# Patient Record
Sex: FEMALE | Race: BLACK OR AFRICAN AMERICAN | NOT HISPANIC OR LATINO | Employment: PART TIME | URBAN - METROPOLITAN AREA
[De-identification: names, ages, dates, MRNs, and addresses within clinical notes are randomized per-mention and may not be internally consistent; named-entity substitution may affect disease eponyms.]

---

## 2019-02-23 ENCOUNTER — HOSPITAL ENCOUNTER (EMERGENCY)
Facility: CLINIC | Age: 24
Discharge: HOME | End: 2019-02-24
Attending: INTERNAL MEDICINE | Admitting: INTERNAL MEDICINE
Payer: COMMERCIAL

## 2019-02-23 ENCOUNTER — APPOINTMENT (OUTPATIENT)
Dept: GENERAL RADIOLOGY | Facility: CLINIC | Age: 24
End: 2019-02-23
Attending: INTERNAL MEDICINE
Payer: COMMERCIAL

## 2019-02-23 VITALS
SYSTOLIC BLOOD PRESSURE: 126 MMHG | OXYGEN SATURATION: 100 % | HEART RATE: 73 BPM | DIASTOLIC BLOOD PRESSURE: 74 MMHG | RESPIRATION RATE: 16 BRPM | TEMPERATURE: 98.7 F

## 2019-02-23 DIAGNOSIS — S86.911A KNEE STRAIN, RIGHT, INITIAL ENCOUNTER: ICD-10-CM

## 2019-02-23 PROCEDURE — 99284 EMERGENCY DEPT VISIT MOD MDM: CPT | Mod: 25 | Performed by: INTERNAL MEDICINE

## 2019-02-23 PROCEDURE — 29505 APPLICATION LONG LEG SPLINT: CPT | Performed by: INTERNAL MEDICINE

## 2019-02-23 PROCEDURE — 73562 X-RAY EXAM OF KNEE 3: CPT | Mod: RT

## 2019-02-23 PROCEDURE — 99283 EMERGENCY DEPT VISIT LOW MDM: CPT | Mod: Z6 | Performed by: INTERNAL MEDICINE

## 2019-02-23 SDOH — HEALTH STABILITY: MENTAL HEALTH: HOW OFTEN DO YOU HAVE A DRINK CONTAINING ALCOHOL?: NEVER

## 2019-02-23 ASSESSMENT — ENCOUNTER SYMPTOMS
ARTHRALGIAS: 1
NUMBNESS: 0
WEAKNESS: 0
ABDOMINAL PAIN: 0
SHORTNESS OF BREATH: 0
FEVER: 0
CONFUSION: 0

## 2019-02-23 NOTE — ED AVS SNAPSHOT
[facility], Emergency Department  [address]  [facility] [place] [address]  Phone:  [phone]  Fax:  [phone]                                    Desi Naik   MRN: [id number]    Department:  [facility], Emergency Department   Date of Visit:  2/23/2019           After Visit Summary Signature Page    I have received my discharge instructions, and my questions have been answered. I have discussed any challenges I see with this plan with the nurse or doctor.    ..........................................................................................................................................  Patient/Patient Representative Signature      ..........................................................................................................................................  Patient Representative Print Name and Relationship to Patient    ..................................................               ................................................  Date                                   Time    ..........................................................................................................................................  Reviewed by Signature/Title    ...................................................              ..............................................  Date                                               Time          22EPIC Rev 08/18

## 2019-02-24 RX ORDER — IBUPROFEN 200 MG
400 TABLET ORAL EVERY 8 HOURS PRN
Qty: 60 TABLET | Refills: 0 | Status: SHIPPED | OUTPATIENT
Start: 2019-02-23 | End: 2019-03-25

## 2019-02-24 NOTE — ED PROVIDER NOTES
History     Chief Complaint   Patient presents with     Knee Pain     Pt reports right knee pain post sleeding on Thursday. Hurts to bend, localized pain. Pt took 400/mg advil without effect.      HPI  Desi Naik is a 23 year old female who presents with right knee pain and stiffness. She was sledding 2 days ago. She took a tumble. She is unsure of the mechanism of the injury. Since then her knee has been stiff and sore. Pain seems worse with flexion of the knee and with driving. Pain is diffuse in the knee, but worst medially and posteriorly. She has no pain in the patella. She has no pain in the hip, thigh, calf, ankle or foot. She has no numbness or weakness. She has no swelling in the calf.    History reviewed. No pertinent past medical history.    History reviewed. No pertinent surgical history.    History reviewed. No pertinent family history.    Social History     Tobacco Use     Smoking status: Never Smoker     Smokeless tobacco: Never Used   Substance Use Topics     Alcohol use: No     Frequency: Never         I have reviewed the Medications, Allergies, Past Medical and Surgical History, and Social History in the Epic system.    Review of Systems   Constitutional: Negative for fever.   Respiratory: Negative for shortness of breath.    Cardiovascular: Negative for chest pain.   Gastrointestinal: Negative for abdominal pain.   Musculoskeletal: Positive for arthralgias.   Neurological: Negative for weakness and numbness.   Psychiatric/Behavioral: Negative for confusion.   All other systems reviewed and are negative.      Physical Exam   BP: 126/74  Pulse: 73  Temp: 98.7  F (37.1  C)  Resp: 16  SpO2: 100 %      Physical Exam   Constitutional: She is oriented to person, place, and time. She appears well-developed and well-nourished. No distress.   HENT:   Head: Normocephalic and atraumatic.   Eyes: EOM are normal. Pupils are equal, round, and reactive to light.   Neck: Normal range of  motion.   Cardiovascular: Normal rate.   Pulmonary/Chest: Effort normal.   Musculoskeletal: She exhibits tenderness.        Right hip: Normal.        Right knee: She exhibits normal range of motion, no swelling, no effusion, no deformity, no LCL laxity, normal patellar mobility, normal meniscus and no MCL laxity. Tenderness found. Medial joint line and lateral joint line tenderness noted. No patellar tendon tenderness noted.        Right ankle: Normal.        Right upper leg: Normal.        Right lower leg: Normal.        Right foot: Normal.   Neurological: She is alert and oriented to person, place, and time. No cranial nerve deficit.   Skin: Skin is warm and dry.   Psychiatric: She has a normal mood and affect. Her behavior is normal.   Nursing note and vitals reviewed.      ED Course        Procedures          Labs/Imaging    Results for orders placed or performed during the hospital encounter of 02/23/19 (from the past 24 hour(s))   XR Knee Right 3 Views    Narrative    XR KNEE RT 3 VW 2/23/2019 11:55 PM    HISTORY: Pain after trauma.    COMPARISON: None.    FINDINGS: No fracture or malalignment. Osseous structures appear  normal. No joint effusion.      Impression    IMPRESSION: No acute osseous abnormality.    YARELIS RUBIO MD         Assessments & Plan (with Medical Decision Making)   Impression:  Young female presents with sledding related right knee injury. She has no effusion on exam. ACL and PCL appear stable. She has medial and posterior pain. MCL and LCL are stable. Will treat with knee immobilizer and NSAID. Follow up sports medicine clinic.    I have reviewed the nursing notes.    I have reviewed the findings, diagnosis, plan and need for follow up with the patient.       Medication List      Started    ibuprofen 200 MG tablet  Commonly known as:  ADVIL/MOTRIN  400 mg, Oral, EVERY 8 HOURS PRN            Final diagnoses:   Knee strain, right, initial encounter       2/23/2019   [facility], [place],  EMERGENCY DEPARTMENT     Ethan Rdz MD  02/24/19 0005

## 2019-02-24 NOTE — DISCHARGE INSTRUCTIONS
Knee immobilizer when up on feet.  Crutches weightbearing as tolerated.  Ibuprofen 400 mg 4 times/ day as needed for pain.  Follow up [facility]. You will be called next week to set up an appointment.

## 2019-02-24 NOTE — ED TRIAGE NOTES
Pt reports right knee pain post sleeding on Thursday. Hurts to bend, localized pain. Pt took 400/mg advil without effect.

## 2019-02-25 ENCOUNTER — TELEPHONE (OUTPATIENT)
Dept: ORTHOPEDICS | Facility: CLINIC | Age: 24
End: 2019-02-25

## 2019-02-25 NOTE — TELEPHONE ENCOUNTER
Called patient and offered a follow up to her ED visit with one of our sports medicine providers. If she calls back please schedule her with any one in sports med for anytime this week or early next week

## 2021-10-16 ENCOUNTER — HOSPITAL ENCOUNTER (EMERGENCY)
Facility: CLINIC | Age: 26
Discharge: HOME | End: 2021-10-16
Attending: EMERGENCY MEDICINE | Admitting: EMERGENCY MEDICINE
Payer: COMMERCIAL

## 2021-10-16 VITALS
OXYGEN SATURATION: 100 % | SYSTOLIC BLOOD PRESSURE: 121 MMHG | RESPIRATION RATE: 16 BRPM | HEART RATE: 61 BPM | TEMPERATURE: 97.3 F | DIASTOLIC BLOOD PRESSURE: 67 MMHG

## 2021-10-16 DIAGNOSIS — R21 RASH: ICD-10-CM

## 2021-10-16 DIAGNOSIS — L03.119 CELLULITIS OF AXILLA, UNSPECIFIED LATERALITY: ICD-10-CM

## 2021-10-16 PROCEDURE — 99283 EMERGENCY DEPT VISIT LOW MDM: CPT

## 2021-10-16 RX ORDER — CEPHALEXIN 500 MG/1
500 CAPSULE ORAL 3 TIMES DAILY
Qty: 21 CAPSULE | Refills: 0 | Status: SHIPPED | OUTPATIENT
Start: 2021-10-16 | End: 2021-10-23

## 2021-10-16 RX ORDER — GINSENG 100 MG
CAPSULE ORAL 2 TIMES DAILY
Qty: 14 G | Refills: 0 | Status: SHIPPED | OUTPATIENT
Start: 2021-10-16 | End: 2021-10-23

## 2021-10-16 ASSESSMENT — ENCOUNTER SYMPTOMS: FEVER: 0

## 2021-10-16 NOTE — ED PROVIDER NOTES
History   Chief Complaint:  Rash    HPI   Desi Naik is a 26 year old female who presents for evaluation of a rash. About two weeks ago the patient started using a new Old Spice deodorant and about a week ago she started to develop a painful and burning rash in both of her underarms. Since then she has not been using the deodorant and has been applying Aquaphor ointment to the area, but the rash has not improved significantly. Due to concern for this new rash, she came into the ED for evaluation. She has no known history of hidradenitis suppurativa. She has not had any fever.     Review of Systems   Constitutional: Negative for fever.   Skin: Positive for rash.   All other systems reviewed and are negative.    Allergies:  The patient has no known allergies.     Medications:  The patient is not currently taking any prescribed medications.     Past Medical History:     The patient denies any past medical history.     Social History:  The patient presents to the ED alone.     Physical Exam     Patient Vitals for the past 24 hrs:   BP Temp Temp src Pulse Resp SpO2   10/16/21 1330 121/67 97.3  F (36.3  C) Oral 61 16 100 %       Physical Exam  Physical Exam   General:  Sitting on bed, comfortable appearing.   HENT:  No obvious trauma to head  Right Ear:  External ear normal.   Left Ear:  External ear normal.   Nose:  Nose normal.   Eyes:  Conjunctivae and EOM are normal.  Neck: Normal range of motion. Neck supple. No tracheal deviation present.   Pulm/Chest: No respiratory distress  M/S: Normal range of motion.   Neuro: Alert. GCS 15.  Skin: Skin is warm and dry. Under bilateral axilla there is scant erythema and warmth, no palpable abscess, no signs of hidradenitis suppurativa.  Very scant amount of purulence underneath the left axilla.  Specifically no palpable or visible abscess to the left axilla.  Not diaphoretic.   Psych: Normal mood and affect. Behavior is normal.         Emergency  Department Course     Emergency Department Course:  Reviewed:  I reviewed nursing notes, vitals and past medical history    Assessments:  1405: I obtained history and examined the patient as noted above.     Disposition:  The patient was discharged to home.     Impression & Plan   Medical Decision Making:  eDsi Naik is a very pleasant 26 year old female who presents for evaluation of skin redness.  This started after using an old spice deodorant that was new for her to use.  The skin was irritated so she discontinued using it and placed Aquaphor over the skin.  I considered allergic reaction, but the skin is erythematous and warm.  She has no history of hidradenitis suppurativa and there is no palpable abscess.  She has no other lesions or rash or reaction on her skin to suggest other conditions such as Dockery-Saad syndrome.  The history, physical exam and supporting data are consistent with cellulitis.  There do not appear at this time to be any complication of cellulitis including necrotizing fascitis, lymphangitis, lymphadenitis, abscess, osteomyelitis, sepsis, or shock.  The patient is not immunosuppressed.  Supportive outpatient management is indicated with antibiotics.  Close follow-up of primary care physician to ensure no progression and rapid resolution.  Area of cellulitis was outlined.  Cellulitis precautions for home.    The treatment plan was discussed with the patient and they expressed understanding of this plan and consented to the plan.  In addition, the patient will return to the emergency department if their symptoms persist, worsen, if new symptoms arise or if there is any concern as other pathology may be present that is not evident at this time. They also understand the importance of close follow up in the clinic and if unable to do so will return to the emergency department for a reevaluation. All questions were answered.     Diagnosis:    ICD-10-CM    1. Rash  R21     2. Cellulitis of axilla, unspecified laterality  L03.119        Discharge Medications:  Discharge Medication List as of 10/16/2021  2:17 PM      START taking these medications    Details   bacitracin (TGT BACITRACIN) 500 UNIT/GM OINT Apply topically 2 times daily for 7 daysDisp-14 g, [id number]      cephALEXin (KEFLEX) 500 MG capsule Take 1 capsule (500 mg) by mouth 3 times daily for 7 days, Disp-21 capsule, R-0, E-Prescribe             Scribe Disclosure:  I, Tian Aldridge, am serving as a scribe at 2:05 PM on 10/16/2021 to document services personally performed by Cameron Anton DO, based on my observations and the provider's statements to me.        Wilbur Anton DO  10/16/21 1524

## 2022-12-23 ENCOUNTER — HOSPITAL ENCOUNTER (EMERGENCY)
Facility: CLINIC | Age: 27
Discharge: HOME | End: 2022-12-23
Admitting: NURSE PRACTITIONER
Payer: COMMERCIAL

## 2022-12-23 VITALS
OXYGEN SATURATION: 100 % | DIASTOLIC BLOOD PRESSURE: 57 MMHG | HEART RATE: 77 BPM | TEMPERATURE: 97.4 F | SYSTOLIC BLOOD PRESSURE: 109 MMHG | RESPIRATION RATE: 16 BRPM

## 2022-12-23 DIAGNOSIS — S61.209A AVULSION OF FINGERTIP, INITIAL ENCOUNTER: ICD-10-CM

## 2022-12-23 PROCEDURE — 99283 EMERGENCY DEPT VISIT LOW MDM: CPT | Mod: 25 | Performed by: NURSE PRACTITIONER

## 2022-12-23 PROCEDURE — 99282 EMERGENCY DEPT VISIT SF MDM: CPT | Performed by: NURSE PRACTITIONER

## 2022-12-23 PROCEDURE — 250N000011 HC RX IP 250 OP 636: Performed by: NURSE PRACTITIONER

## 2022-12-23 PROCEDURE — 250N000009 HC RX 250: Performed by: NURSE PRACTITIONER

## 2022-12-23 PROCEDURE — 90471 IMMUNIZATION ADMIN: CPT | Performed by: NURSE PRACTITIONER

## 2022-12-23 PROCEDURE — 90715 TDAP VACCINE 7 YRS/> IM: CPT | Performed by: NURSE PRACTITIONER

## 2022-12-23 RX ADMIN — CLOSTRIDIUM TETANI TOXOID ANTIGEN (FORMALDEHYDE INACTIVATED), CORYNEBACTERIUM DIPHTHERIAE TOXOID ANTIGEN (FORMALDEHYDE INACTIVATED), BORDETELLA PERTUSSIS TOXOID ANTIGEN (GLUTARALDEHYDE INACTIVATED), BORDETELLA PERTUSSIS FILAMENTOUS HEMAGGLUTININ ANTIGEN (FORMALDEHYDE INACTIVATED), BORDETELLA PERTUSSIS PERTACTIN ANTIGEN, AND BORDETELLA PERTUSSIS FIMBRIAE 2/3 ANTIGEN 0.5 ML: 5; 2; 2.5; 5; 3; 5 INJECTION, SUSPENSION INTRAMUSCULAR at 19:28

## 2022-12-23 RX ADMIN — Medication 3 ML: at 19:15

## 2022-12-23 ASSESSMENT — ENCOUNTER SYMPTOMS
SHORTNESS OF BREATH: 0
FREQUENCY: 0
COUGH: 0
WOUND: 1
VOMITING: 0
NAUSEA: 0
CHILLS: 0
DIZZINESS: 0
HEADACHES: 0
FEVER: 0
SORE THROAT: 0

## 2022-12-23 ASSESSMENT — ACTIVITIES OF DAILY LIVING (ADL): ADLS_ACUITY_SCORE: 33

## 2022-12-24 NOTE — ED PROVIDER NOTES
ED Provider Note  [facility]      History     Chief Complaint   Patient presents with     Laceration     HPI  Desi Naik is a 27 year old female who presents to the emergency department with a left pinky laceration she suffered shortly before arrival to the department. She reports she was cutting an apple this evening when she cut her small finger on her left hand.    She is unsure of her tetanus status, and [facility] has no listing. She denies any other complaints, denies being on any blood thinners.    Past Medical History  No past medical history on file.  No past surgical history on file.  mineral oil-white petrolatum (MINERIN, EUCERIN) CREA  triamcinolone (KENALOG) 0.1 % cream      No Known Allergies  Family History  Family History   Problem Relation Age of Onset     Family History Negative Mother      Family History Negative Father      Social History   Social History     Tobacco Use     Smoking status: Never     Smokeless tobacco: Never   Substance Use Topics     Alcohol use: No     Drug use: No      Past medical history, past surgical history, medications, allergies, family history, and social history were reviewed with the patient. No additional pertinent items.       Review of Systems   Constitutional: Negative for chills and fever.   HENT: Negative for sore throat.    Respiratory: Negative for cough and shortness of breath.    Cardiovascular: Negative for chest pain.   Gastrointestinal: Negative for nausea and vomiting.   Genitourinary: Negative for frequency.   Skin: Positive for wound.   Neurological: Negative for dizziness and headaches.     A complete review of systems was performed with pertinent positives and negatives noted in the HPI, and all other systems negative.    Physical Exam   BP: 109/57  Pulse: 77  Temp: 97.4  F (36.3  C)  Resp: 16  SpO2: 100 %  Physical Exam  Vitals reviewed.   Constitutional:       Appearance: Normal appearance.   HENT:       Head: Normocephalic.      Right Ear: External ear normal.      Left Ear: External ear normal.      Mouth/Throat:      Mouth: Mucous membranes are moist.   Eyes:      Conjunctiva/sclera: Conjunctivae normal.   Cardiovascular:      Rate and Rhythm: Normal rate.      Pulses: Normal pulses.   Pulmonary:      Effort: Pulmonary effort is normal.   Abdominal:      General: Abdomen is flat.   Musculoskeletal:         General: Normal range of motion.      Cervical back: Normal range of motion.   Skin:     Findings: Wound present.      Comments: Small, approximate 1 cm left pinky fingertip avulsion.   Neurological:      Mental Status: She is alert.         ED Course      Procedures        [facility]    -Fingertip Avulsion Repair    Date/Time: 12/23/2022 8:04 PM  Performed by: eTmo Wu APRN CNP  Authorized by: Temo Wu APRN CNP     Risks, benefits and alternatives discussed.      ANESTHESIA (see MAR for exact dosages):     Anesthesia method:  Topical application    Topical anesthetic:  LET  Avulsion DETAILS     Location:  Finger    Finger location:  L small finger    Length (cm):  1  EXPLORATION:     Hemostasis achieved with:  LET and tourniquet    Wound extent: no signs of injury, no nerve damage, no tendon damage, no underlying fracture and no vascular damage      Contaminated: no      TREATMENT:     Area cleansed with:  Betadine and soap and water    Amount of cleaning:  Extensive    Irrigation solution:  Tap water    Irrigation volume:  Large vollume    SKIN REPAIR     Repair method:  Surgifoam and non-adherent gauze    POST-PROCEDURE DETAILS     Dressing:  Non-adherent dressing and tube gauze      PROCEDURE  Describe Procedure: Patient with fingertip avulsion to her left pinky, without any underlying tissue that would lend itself for suture repair. Finger was mildly oozing upon arrival. The tip of her finger was soaked in LET and following this a tourniquet was  applied to the base of her left pinky. Hemostasis was achieved with this. Betadine was used to cleanse the area followed by irrigation with tap water fpr 5 minutes. Tourniquet was removed, and surgifoam was applied to exposed tissue. This was covered with gauze, and wrapped in Kerlix. This was then covered with tube gauze.     No results found for any visits on 12/23/22.  Medications   lido-EPINEPHrine-tetracaine (LET) topical gel GEL (3 mLs Topical Given 12/23/22 1915)   Tdap (tetanus-diphtheria-acell pertussis) (ADACEL) injection 0.5 mL (0.5 mLs Intramuscular Given 12/23/22 1928)        Assessments & Plan (with Medical Decision Making)    27 year old female who presents with fingertip avulsion. The distal tip of her left pinky was cleanly avulsed without any tissue to be sutured. There was also no damage to the underlying bone or tendon damage. Bleeding was stopped with tourniquet application and LET application. Surgifoam was applied following copious irrigation and cleansing and covered with gauze.     As her tetanus status was unknown, she was given a tdap booster.    I reviewed the plan to keep the surgifoam in place underneath the gauze at least for the next 12 hours. Gave her additional surgifoam and guaze she can use to reapply these as needed. Discussed monitoring for signs of infection, such as redness, increased drainage, fevers or chills.    I have reviewed the nursing notes. I have reviewed the findings, diagnosis, plan and need for follow up with the patient.    Discharge Medication List as of 12/23/2022  7:54 PM          Final diagnoses:   Avulsion of fingertip, initial encounter       --  GRANT Mehta CNP  [facility] EMERGENCY DEPARTMENT  12/23/2022     Temo Wu APRN CNP  12/23/22 2025

## 2022-12-24 NOTE — ED TRIAGE NOTES
Laceration to the left pinky finger while slicing apples at home. Bleeding controlled in triage.      Triage Assessment     Row Name 12/23/22 4556       Triage Assessment (Adult)    Airway WDL WDL       Respiratory WDL    Respiratory WDL WDL       Skin Circulation/Temperature WDL    Skin Circulation/Temperature WDL WDL       Cardiac WDL    Cardiac WDL WDL       Peripheral/Neurovascular WDL    Peripheral Neurovascular WDL WDL       Cognitive/Neuro/Behavioral WDL    Cognitive/Neuro/Behavioral WDL WDL

## 2022-12-24 NOTE — DISCHARGE INSTRUCTIONS
TODAY'S VISIT:  - You were seen today for laceration of the tip of your finger called an avulsion.    - Emergency Department testing is focused on the potential causes of your symptoms that are the most dangerous possibilities and cannot cover every possibility. Based on the evaluation, it was deemed sufficiently safe to discharge and continue management through the clinics. Thus, follow-up is very important to assess for improvement/worsening, potential further testing, and potential treatment adjustments.     FOLLOW-UP:  Please make an appointment to follow up with:  - Your Primary Care Provider as needed.      OTHER INSTRUCTIONS:  - Keep the tube gauze on at least 12 hours. After this you can remove the larger bulkier tube dressing. If you remove all the gauze, try to keep the foam surgifoam in place to the tip of your pinky to help prevent future bleeding. If you need to change the dressing we are sending you home with extra gauze and surgifoam you can place on your finger like I showed you.    RETURN TO THE EMERGENCY DEPARTMENT  Return to the Emergency Department at any time for any new or worsening symptoms or any concerns.

## 2022-12-24 NOTE — PROGRESS NOTES
Discharge: Discharged from ED to home. Discharge instructions given and patient voiced understanding.   Skin assessment: Left pinky finger site dry with dressing. Tdap given. Discharge.

## 2023-04-02 ENCOUNTER — HEALTH MAINTENANCE LETTER (OUTPATIENT)
Age: 28
End: 2023-04-02

## 2024-06-08 ENCOUNTER — HEALTH MAINTENANCE LETTER (OUTPATIENT)
Age: 29
End: 2024-06-08

## 2025-01-15 ENCOUNTER — LAB REQUISITION (OUTPATIENT)
Dept: LAB | Facility: CLINIC | Age: 30
End: 2025-01-15
Payer: COMMERCIAL

## 2025-01-15 DIAGNOSIS — Z12.4 ENCOUNTER FOR SCREENING FOR MALIGNANT NEOPLASM OF CERVIX: ICD-10-CM

## 2025-01-15 PROCEDURE — G0145 SCR C/V CYTO,THINLAYER,RESCR: HCPCS | Mod: ORL | Performed by: OBSTETRICS & GYNECOLOGY

## 2025-01-20 LAB
BKR LAB AP GYN ADEQUACY: NORMAL
BKR LAB AP GYN INTERPRETATION: NORMAL
BKR LAB AP HPV REFLEX: NORMAL
BKR LAB AP LMP: NORMAL
BKR LAB AP PREVIOUS ABNL DX: NORMAL
BKR LAB AP PREVIOUS ABNORMAL: NORMAL
PATH REPORT.COMMENTS IMP SPEC: NORMAL
PATH REPORT.COMMENTS IMP SPEC: NORMAL
PATH REPORT.RELEVANT HX SPEC: NORMAL

## 2025-06-15 ENCOUNTER — HEALTH MAINTENANCE LETTER (OUTPATIENT)
Age: 30
End: 2025-06-15